# Patient Record
Sex: MALE | Race: WHITE | NOT HISPANIC OR LATINO
[De-identification: names, ages, dates, MRNs, and addresses within clinical notes are randomized per-mention and may not be internally consistent; named-entity substitution may affect disease eponyms.]

---

## 2024-08-15 ENCOUNTER — APPOINTMENT (OUTPATIENT)
Dept: ORTHOPEDIC SURGERY | Facility: CLINIC | Age: 24
End: 2024-08-15

## 2024-08-15 VITALS — HEIGHT: 73 IN | BODY MASS INDEX: 29.82 KG/M2 | WEIGHT: 225 LBS

## 2024-08-15 DIAGNOSIS — S62.339A DISPLACED FRACTURE OF NECK OF UNSPECIFIED METACARPAL BONE, INITIAL ENCOUNTER FOR CLOSED FRACTURE: ICD-10-CM

## 2024-08-15 DIAGNOSIS — S62.306A UNSPECIFIED FRACTURE OF FIFTH METACARPAL BONE, RIGHT HAND, INITIAL ENCOUNTER FOR CLOSED FRACTURE: ICD-10-CM

## 2024-08-15 PROBLEM — Z00.00 ENCOUNTER FOR PREVENTIVE HEALTH EXAMINATION: Status: ACTIVE | Noted: 2024-08-15

## 2024-08-15 PROCEDURE — 73130 X-RAY EXAM OF HAND: CPT | Mod: RT

## 2024-08-15 PROCEDURE — 29125 APPL SHORT ARM SPLINT STATIC: CPT | Mod: RT

## 2024-08-15 PROCEDURE — 99203 OFFICE O/P NEW LOW 30 MIN: CPT | Mod: 25

## 2024-08-15 NOTE — HISTORY OF PRESENT ILLNESS
[de-identified] : Mr. Hui is a 23 year old RHD male who presents to the walk-in for evaluation of right hand injury that occurred 4 to 5 days ago.  He states he was boxing when he hit the bag wrong and felt pain in the right hand.  He states he initially thought it was a sprain however states his hand began to get more swollen and he noticed he was unable to fully move the fifth digit.  He has not taken anything for pain and denies any numbness or tingling or ecchymosis.

## 2024-08-15 NOTE — IMAGING
[de-identified] : Right hand: Positive tenderness over the fifth metacarpal, positive edema over the dorsum of the hand, mild overlap deformity with flexion, unable to fully extend the fourth digit, depression deformity over the MP joint, neurovascular intact.  X-ray R hand: v metacarpal fracture with mild dorsal displacement.  X-ray R hand post reduction:  acceptable alignment.

## 2024-08-15 NOTE — ASSESSMENT
[FreeTextEntry1] : 23 year old male with R V metacarpal fracture.  I have placed him in a cast using fiberglass and he will f/u next week to see Dr. Andrew.  He is aware there is a possibility of surgery.  I reviewed cast care instructions and he will keep the hand elevated to prevent edema.  He will use OTC medication and is aware if there is any issue he can contact the office.

## 2024-08-19 ENCOUNTER — NON-APPOINTMENT (OUTPATIENT)
Age: 24
End: 2024-08-19

## 2024-09-05 ENCOUNTER — APPOINTMENT (OUTPATIENT)
Dept: ORTHOPEDIC SURGERY | Facility: CLINIC | Age: 24
End: 2024-09-05

## 2024-09-05 DIAGNOSIS — S62.306A UNSPECIFIED FRACTURE OF FIFTH METACARPAL BONE, RIGHT HAND, INITIAL ENCOUNTER FOR CLOSED FRACTURE: ICD-10-CM

## 2024-09-05 PROCEDURE — 73130 X-RAY EXAM OF HAND: CPT | Mod: RT

## 2024-09-05 PROCEDURE — L3908: CPT | Mod: RT

## 2024-09-05 PROCEDURE — 99212 OFFICE O/P EST SF 10 MIN: CPT | Mod: 25

## 2024-09-05 NOTE — ASSESSMENT
[FreeTextEntry1] : 23-year-old male here 3 weeks status post right fifth metacarpal fracture.  He was placed in a cast at his initial visit after the reduction was performed.  Consultation with our hand surgeons was had.  The ER spoke with the patient we discussed the nonoperative course of management.  His cast was removed in the office today.  Doing well overall.  Physical examination right hand: Skin intact after cast removal.  Tender to palpation over the right fifth metacarpal.  Moderate swelling appreciated throughout.  Unable to fully flex at this time secondary to stiffness.  Can fully extend with a mild extensor lag.  No deviations appreciated.  Sensation is intact distally.  Neurovascularly intact.  X-rays of the right hand taken in the office today reveal a well-healing close mildly displaced fracture of the fifth metacarpal.  No subluxations or dislocations.  The patient is doing well overall.  Her cast was removed in the office today.  She was transition to a cock-up wrist brace in the office today.  I advised utilization of the cock-up wrist brace at all times especially when active.  Patient may remove the brace for hygiene purposes.  X-rays were discussed in depth and reveal acceptable alignment of fracture pattern with routine healing.  Explained that the fracture will likely require 2 more weeks of bracing for full and optimal healing.  Encourage gentle range of motion activity modification within her limitations at this time.  Advised no gym/sports at least until repeat evaluation.  I will have the patient follow-up with me in 2 weeks for final x-rays and evaluation/treatment. All questions and concerns addressed to patient's satisfaction. Patient expresses full understanding of treatment plan.

## 2024-09-17 ENCOUNTER — APPOINTMENT (OUTPATIENT)
Dept: ORTHOPEDIC SURGERY | Facility: CLINIC | Age: 24
End: 2024-09-17
Payer: COMMERCIAL

## 2024-09-17 ENCOUNTER — RESULT CHARGE (OUTPATIENT)
Age: 24
End: 2024-09-17

## 2024-09-17 DIAGNOSIS — S62.339A DISPLACED FRACTURE OF NECK OF UNSPECIFIED METACARPAL BONE, INITIAL ENCOUNTER FOR CLOSED FRACTURE: ICD-10-CM

## 2024-09-17 PROCEDURE — 73130 X-RAY EXAM OF HAND: CPT | Mod: RT

## 2024-09-17 PROCEDURE — 99202 OFFICE O/P NEW SF 15 MIN: CPT

## 2024-09-17 NOTE — HISTORY OF PRESENT ILLNESS
[de-identified] : 23-year-old male right fifth metacarpal neck fracture.  Comes in today for evaluation.  He has been in a cast he just came out of the cast the other day.  And he comes in today for evaluation.  Complaining of some stiffness in his hand.

## 2024-09-17 NOTE — DATA REVIEWED
[FreeTextEntry1] : Radiographs 3 views the right hand reviewed showing acceptable alignment of the fifth metacarpal neck fracture.

## 2024-09-19 ENCOUNTER — APPOINTMENT (OUTPATIENT)
Dept: ORTHOPEDIC SURGERY | Facility: CLINIC | Age: 24
End: 2024-09-19

## 2025-07-07 ENCOUNTER — NON-APPOINTMENT (OUTPATIENT)
Age: 25
End: 2025-07-07

## 2025-07-07 ENCOUNTER — APPOINTMENT (OUTPATIENT)
Dept: ORTHOPEDIC SURGERY | Facility: CLINIC | Age: 25
End: 2025-07-07

## 2025-07-07 VITALS — BODY MASS INDEX: 29.82 KG/M2 | WEIGHT: 225 LBS | HEIGHT: 73 IN

## 2025-07-07 PROCEDURE — L1902: CPT | Mod: LT

## 2025-07-07 PROCEDURE — 73610 X-RAY EXAM OF ANKLE: CPT | Mod: LT

## 2025-07-07 PROCEDURE — 99213 OFFICE O/P EST LOW 20 MIN: CPT | Mod: 25
